# Patient Record
Sex: FEMALE | URBAN - METROPOLITAN AREA
[De-identification: names, ages, dates, MRNs, and addresses within clinical notes are randomized per-mention and may not be internally consistent; named-entity substitution may affect disease eponyms.]

---

## 2024-10-07 ENCOUNTER — OFFICE VISIT (OUTPATIENT)
Dept: URBAN - METROPOLITAN AREA CLINIC 124 | Facility: CLINIC | Age: 64
End: 2024-10-07

## 2024-11-11 ENCOUNTER — OFFICE VISIT (OUTPATIENT)
Dept: URBAN - METROPOLITAN AREA CLINIC 124 | Facility: CLINIC | Age: 64
End: 2024-11-11
Payer: MEDICARE

## 2024-11-11 ENCOUNTER — DASHBOARD ENCOUNTERS (OUTPATIENT)
Age: 64
End: 2024-11-11

## 2024-11-11 ENCOUNTER — LAB OUTSIDE AN ENCOUNTER (OUTPATIENT)
Dept: URBAN - METROPOLITAN AREA CLINIC 124 | Facility: CLINIC | Age: 64
End: 2024-11-11

## 2024-11-11 VITALS
SYSTOLIC BLOOD PRESSURE: 122 MMHG | BODY MASS INDEX: 20.41 KG/M2 | WEIGHT: 127 LBS | TEMPERATURE: 96.9 F | DIASTOLIC BLOOD PRESSURE: 73 MMHG | HEART RATE: 68 BPM | HEIGHT: 66 IN

## 2024-11-11 DIAGNOSIS — Z12.11 COLON CANCER SCREENING: ICD-10-CM

## 2024-11-11 DIAGNOSIS — R19.7 DIARRHEA, UNSPECIFIED TYPE: ICD-10-CM

## 2024-11-11 PROCEDURE — 99204 OFFICE O/P NEW MOD 45 MIN: CPT | Performed by: INTERNAL MEDICINE

## 2024-11-11 RX ORDER — CLOBETASOL PROPIONATE 0.5 MG/G
PLEASE SEE ATTACHED FOR DETAILED DIRECTIONS CREAM TOPICAL
Qty: 60 GRAM | Refills: 0 | Status: DISCONTINUED | COMMUNITY

## 2024-11-11 RX ORDER — MOXIFLOXACIN 5 MG/ML
INSTILL 1 DROP INTO BOTH EYES FOUR TIMES DAILY FOR 1 WEEK,START AFTER SURGERY SOLUTION/ DROPS OPHTHALMIC
Qty: 3 MILLILITER | Refills: 1 | Status: DISCONTINUED | COMMUNITY

## 2024-11-11 RX ORDER — BROMFENAC SODIUM 0.7 MG/ML
INSTILL 1 DROP IN OPERATIVE EYE DAILY,START 3 DAYS BEFORE SURGERY & 28 DAYS AFTER SURGERY THEN STOP SOLUTION/ DROPS OPHTHALMIC
Qty: 3 MILLILITER | Refills: 1 | Status: DISCONTINUED | COMMUNITY

## 2024-11-11 RX ORDER — HYDROCORTISONE 25 MG/G
CREAM TOPICAL
Qty: 28 GRAM | Status: DISCONTINUED | COMMUNITY

## 2024-11-11 RX ORDER — METRONIDAZOLE 7.5 MG/G
APPLY THIN LAYER TO FACE AS DIRECTED 1 OR 2 TIMES DAILY GEL TOPICAL
Qty: 45 GRAM | Refills: 0 | Status: DISCONTINUED | COMMUNITY

## 2024-11-11 RX ORDER — ATORVASTATIN CALCIUM 20 MG/1
1 TABLET TABLET, FILM COATED ORAL ONCE A DAY
Status: ACTIVE | COMMUNITY

## 2024-11-11 RX ORDER — PREDNISOLONE ACETATE 10 MG/ML
PLEASE SEE ATTACHED FOR DETAILED DIRECTIONS SUSPENSION/ DROPS OPHTHALMIC
Qty: 10 MILLILITER | Refills: 1 | Status: DISCONTINUED | COMMUNITY

## 2024-11-11 NOTE — HPI-TODAY'S VISIT:
This is a 64-year-old female referred by Dr Eduardo Suresh for GI consultation and a copy will be sent to the referring provider.  Patient had a directly scheduled colonoscopy with me on December 18, 2019 for screening.  This revealed internal hemorrhoids and a moderately redundant and tortuous colon but otherwise normal exam.  I recommended repeat exam in 5 years due to her difficult anatomy.  Pt retired (was a pharmacist). Her  retired as well.  Pt may need to have right should - rotater cuff surgery.  Pt went to [facility] and [facility]. Pt has diarrhea once a week with urgency and incontinence.  Pt denies abdominal pain.

## 2025-04-07 ENCOUNTER — TELEPHONE ENCOUNTER (OUTPATIENT)
Dept: URBAN - METROPOLITAN AREA CLINIC 92 | Facility: CLINIC | Age: 65
End: 2025-04-07

## 2025-04-08 ENCOUNTER — CLAIMS CREATED FROM THE CLAIM WINDOW (OUTPATIENT)
Dept: URBAN - METROPOLITAN AREA SURGERY CENTER 16 | Facility: SURGERY CENTER | Age: 65
End: 2025-04-08
Payer: MEDICARE

## 2025-04-08 DIAGNOSIS — Z12.11 COLON CANCER SCREENING: ICD-10-CM

## 2025-04-08 DIAGNOSIS — Z83.719 FAMILY HISTORY OF COLON POLYPS, UNSPECIFIED: ICD-10-CM

## 2025-04-08 PROCEDURE — G0121 COLON CA SCRN NOT HI RSK IND: HCPCS | Performed by: INTERNAL MEDICINE

## 2025-04-08 PROCEDURE — 0528F RCMND FLW-UP 10 YRS DOCD: CPT | Performed by: INTERNAL MEDICINE

## 2025-04-08 PROCEDURE — 00812 ANES LWR INTST SCR COLSC: CPT | Performed by: NURSE ANESTHETIST, CERTIFIED REGISTERED

## 2025-04-08 PROCEDURE — 00812 ANES LWR INTST SCR COLSC: CPT | Performed by: ANESTHESIOLOGY

## 2025-04-08 PROCEDURE — G0121 COLON CA SCRN NOT HI RSK IND: HCPCS | Performed by: CLINIC/CENTER

## 2025-04-08 PROCEDURE — 0528F RCMND FLW-UP 10 YRS DOCD: CPT | Performed by: CLINIC/CENTER
